# Patient Record
Sex: FEMALE | Race: AMERICAN INDIAN OR ALASKA NATIVE | URBAN - METROPOLITAN AREA
[De-identification: names, ages, dates, MRNs, and addresses within clinical notes are randomized per-mention and may not be internally consistent; named-entity substitution may affect disease eponyms.]

---

## 2021-01-01 ENCOUNTER — OFFICE VISIT (OUTPATIENT)
Dept: URBAN - METROPOLITAN AREA CLINIC 45 | Facility: CLINIC | Age: 81
End: 2021-01-01
Payer: MEDICARE

## 2021-01-01 DIAGNOSIS — M19.90 ARTHRITIS: ICD-10-CM

## 2021-01-01 DIAGNOSIS — Z79.899 OTHER LONG TERM (CURRENT) DRUG THERAPY: Primary | ICD-10-CM

## 2021-01-01 DIAGNOSIS — M35.00 SICCA SYNDROME: Primary | ICD-10-CM

## 2021-01-01 PROCEDURE — 92134 CPTRZ OPH DX IMG PST SGM RTA: CPT | Performed by: OPTOMETRIST

## 2021-01-01 PROCEDURE — 99212 OFFICE O/P EST SF 10 MIN: CPT | Performed by: OPTOMETRIST

## 2021-01-01 PROCEDURE — 92083 EXTENDED VISUAL FIELD XM: CPT | Performed by: OPTOMETRIST

## 2021-01-01 PROCEDURE — 92004 COMPRE OPH EXAM NEW PT 1/>: CPT | Performed by: OPTOMETRIST

## 2021-01-01 ASSESSMENT — INTRAOCULAR PRESSURE
OD: 16
OD: 11
OS: 18
OS: 10

## 2021-01-01 ASSESSMENT — KERATOMETRY
OS: 45.63
OD: 45.38

## 2021-06-28 NOTE — IMPRESSION/PLAN
Impression: Sicca syndrome: M35.00. Plan: preservative free artificial tears every 2-3 hours, omega 3 fatty acids- take as directed on bottle, plenty of fluids (avoid alcohol), warm compresses 2 x day, humidifier, blink frequently particularly when on the computer/tv/reading, ointment before bed, moisture goggles before bed.

## 2021-07-20 NOTE — IMPRESSION/PLAN
Called pt to set up appointment for Hernia. Pt requested a Tuesday or Thursday, NP would like for pt to come in Friday. Left voice message for call back to office to schedule. Impression: Sicca syndrome: M35.00.  Plan: HEAVY LUBRICATION